# Patient Record
Sex: FEMALE | Race: BLACK OR AFRICAN AMERICAN
[De-identification: names, ages, dates, MRNs, and addresses within clinical notes are randomized per-mention and may not be internally consistent; named-entity substitution may affect disease eponyms.]

---

## 2019-08-14 ENCOUNTER — HOSPITAL ENCOUNTER (INPATIENT)
Dept: HOSPITAL 74 - YASAS | Age: 64
LOS: 14 days | Discharge: HOME | DRG: 772 | End: 2019-08-28
Attending: NEUROMUSCULOSKELETAL MEDICINE & OMM | Admitting: NEUROMUSCULOSKELETAL MEDICINE & OMM
Payer: COMMERCIAL

## 2019-08-14 VITALS — BODY MASS INDEX: 16.7 KG/M2

## 2019-08-14 DIAGNOSIS — F10.20: Primary | ICD-10-CM

## 2019-08-14 DIAGNOSIS — D64.9: ICD-10-CM

## 2019-08-14 DIAGNOSIS — F11.20: ICD-10-CM

## 2019-08-14 PROCEDURE — HZ42ZZZ GROUP COUNSELING FOR SUBSTANCE ABUSE TREATMENT, COGNITIVE-BEHAVIORAL: ICD-10-PCS | Performed by: ALLERGY & IMMUNOLOGY

## 2019-08-14 RX ADMIN — Medication SCH MG: at 22:09

## 2019-08-14 RX ADMIN — NICOTINE SCH MG: 14 PATCH, EXTENDED RELEASE TRANSDERMAL at 21:30

## 2019-08-14 RX ADMIN — ACETAMINOPHEN PRN MG: 325 TABLET ORAL at 22:08

## 2019-08-14 NOTE — HP
CIWA Score


Nausea/Vomitin-Mild Nausea/No Vomiting


Muscle Tremors: None


Anxiety: 2


Agitation: 3


Paroxysmal Sweats: 2


Orientation: 0-Oriented


Tacttile Disturbances: 2-Mild Itch/Numbness/Burn


Auditory Disturbances: 0-None


Visual Disturbances: 0-None


Headache: 2-Mild


CIWA-Ar Total Score: 12





- Admission Criteria


[facility] Guidelines: Admission for Medically Managed Detox: 


Requires at least one of the followin. CIWA greater than 12


2. Seizures within the past 24 hours


3. Delirium tremens within the past 24 hours


4. Hallucinations within the past 24 hours


5. Acute intervention needed for co  occurring medical disorder


6. Acute intervention needed for co  occurring psychiatric disorder


7. Severe withdrawal that cannot be handled at a lower level of care (continued


    vomiting, continued diarrhea, abnormal vital signs) requiring intravenous


    medication and/or fluids


8. Pregnancy








Admission ROS [facility]





- [facility]


Chief Complaint: 





alcohol detox


Allergies/Adverse Reactions: 


 Allergies











Allergy/AdvReac Type Severity Reaction Status Date / Time


 


No Known Allergies Allergy   Verified 19 16:43











History of Present Illness: 








Patient is a 63 yo F with a PMhx Anemia, coming in for detox from alcohol. She 

does not want rehab.


Last drink at 12:30pm. 1 Can


Usually drinks 2 cans of beers a day for many years.


Patient has hx of Heroin use, currently on 70mg of Methadone. She forgot the 

name of the place but says its in [place].


Denies current heroin use.





Lives in an apartment with boyfriend.


Smokes half ppd.


denies hx of seizures, blacking out.








Patient frail appearing, malnourished, and currently unsafe for d/c at this 

time.


Will admit for detox.





Here for detox last -2014


Exam Limitations: No Limitations





- Ebola screening


Have you traveled outside of the country in the last 21 days: No


Have you had contact with anyone from an Ebola affected area: No





- Review of Systems


Constitutional: No Symptoms Reported


EENT: reports: No Symptoms Reported


Respiratory: reports: No Symptoms reported


Cardiac: reports: No Symptoms Reported


GI: reports: No Symptoms Reported


: reports: No Symptoms Reported





Patient History





- Patient Medical History


Hx Anemia: Yes (no med)


Hx Asthma: No


Hx Chronic Obstructive Pulmonary Disease (COPD): No


Hx Cancer: No


Hx Cardiac Disorders: No


Hx Congestive Heart Failure: No


Hx Hypertension: No


Hx Hypercholesterolemia: No


Hx Pacemaker: No


HX Cerebrovascular Accident: No


Hx Seizures: No


Hx Dementia: No


Hx Diabetes: No


Hx Gastrointestinal Disorders: No


Hx Liver Disease: No


Hx Genitourinary Disorders: No


Hx Renal Disease (ESRD): No


Hx Thyroid Disease: No


Hx Human Immunodeficiency Virus (HIV): No ( last)


Hx Hepatitis C: No


Hx Depression: No


Hx Suicide Attempt: No


Hx Bipolar Disorder: No


Hx Schizophrenia: No





- Patient Surgical History


Past Surgical History: Yes


Hx Neurologic Surgery: No


Hx Cataract Extraction: No


Hx Cardiac Surgery: No


Hx Lung Surgery: No


Hx Breast Surgery: No


Hx Breast Biopsy: No


Hx Abdominal Surgery: Yes (bleeding peptic ulcer since age of 43 lbs)


Hx Appendectomy: No


Hx Cholecystectomy: No


Hx Genitourinary Surgery: No


Hx  Section: No


Hx Orthopedic Surgery: No


Anesthesia Reaction: No





- PPD History


Date: 14





- Smoking Cessation


Smoking history: Current every day smoker


Have you smoked in the past 12 months: Yes


Aproximately how many cigarettes per day: 10


Cigars Per Day: 0


Hx Chewing Tobacco Use: No


Initiated information on smoking cessation: Yes


'Breaking Loose' booklet given: 19





- Substances abused


  ** Alcohol


Substance route: Oral


Frequency: Daily


Amount used: BEER- 2 16OZ


Age of first use: 15


Date of last use: 19





Family Disease History





- Family Disease History


Family Disease History: Other: Brother (alcohol,), Sister (alcohol,

)





Admission Physical Exam [facility]





- Vital Signs


Vital Signs: 


 Vital Signs - 24 hr











  19





  16:42


 


Temperature 98.7 F


 


Pulse Rate 85


 


Respiratory 18





Rate 


 


Blood Pressure 112/78














- Physical


General Appearance: Yes: Thin


HEENTM: Yes: Scleral Ictenus R, Scleral Ictenus L


Respiratory: Yes: No Respiratory Distress, No Accessory Muscle Use


Neck: Yes: Supple


Cardiology: Yes: Regular Rate, S1, S2


Abdominal: Yes: Normal Bowel Sounds, Non Tender, Soft


Extremities: No: Pedal Edema





- Diagnostic


(1) Alcohol dependence


Current Visit: No   Status: Acute   





(2) Anemia


Current Visit: No   Status: Acute   





(3) Methadone maintenance therapy patient


Current Visit: No   Status: Acute   





Breathalyzer





- Breathalyzer


Breathalyzer: 0.050





Urine Drug Screen





- Test Device


Lot number: [id number]


Expiration date: 20





- Control


Is test valid?: Yes





- Results


Drug screen NEGATIVE: No


Urine drug screen results: MTD-Methadone





Inpatient Rehab Admission





- Rehab Decision to Admit


Inpatient rehab admission?: No

## 2019-08-14 NOTE — PN
Teaching Attending Note


Name of Resident: Jody Morales





ATTENDING PHYSICIAN STATEMENT





I saw and evaluated the patient.


I reviewed the resident's note and discussed the case with the resident.


I agree with the resident's findings and plan as documented.








SUBJECTIVE: 65 yo female with h/o OUD on Methadone maintenance- here for rehab 

from alcohol use. Pt states she drinks 2 beers a day. Does not get withdrawal Sx

, no h/o DT's, seizures.








OBJECTIVE:


 Vital Signs - 24 hr











  08/14/19





  16:42


 


Temperature 98.7 F


 


Pulse Rate 85


 


Respiratory 18





Rate 


 


Blood Pressure 112/78








alert and oriented


thin/cahectic








ASSESSMENT AND PLAN:


pt admitted for rehab for AUD


continue methadone

## 2019-08-15 LAB
ALBUMIN SERPL-MCNC: 3.3 G/DL (ref 3.4–5)
ALP SERPL-CCNC: 235 U/L (ref 45–117)
ALT SERPL-CCNC: 11 U/L (ref 13–61)
ANION GAP SERPL CALC-SCNC: 7 MMOL/L (ref 8–16)
AST SERPL-CCNC: 11 U/L (ref 15–37)
BILIRUB SERPL-MCNC: 1 MG/DL (ref 0.2–1)
BUN SERPL-MCNC: 5.4 MG/DL (ref 7–18)
CALCIUM SERPL-MCNC: 9.6 MG/DL (ref 8.5–10.1)
CHLORIDE SERPL-SCNC: 103 MMOL/L (ref 98–107)
CO2 SERPL-SCNC: 29 MMOL/L (ref 21–32)
CREAT SERPL-MCNC: 0.5 MG/DL (ref 0.55–1.3)
DEPRECATED RDW RBC AUTO: 16 % (ref 11.6–15.6)
GLUCOSE SERPL-MCNC: 86 MG/DL (ref 74–106)
HCT VFR BLD CALC: 35.1 % (ref 32.4–45.2)
HGB BLD-MCNC: 10.9 GM/DL (ref 10.7–15.3)
MCH RBC QN AUTO: 21.8 PG (ref 25.7–33.7)
MCHC RBC AUTO-ENTMCNC: 31 G/DL (ref 32–36)
MCV RBC: 70.2 FL (ref 80–96)
PLATELET # BLD AUTO: 413 K/MM3 (ref 134–434)
PMV BLD: 8.5 FL (ref 7.5–11.1)
POTASSIUM SERPLBLD-SCNC: 4.4 MMOL/L (ref 3.5–5.1)
PROT SERPL-MCNC: 8.3 G/DL (ref 6.4–8.2)
RBC # BLD AUTO: 4.99 M/MM3 (ref 3.6–5.2)
SODIUM SERPL-SCNC: 139 MMOL/L (ref 136–145)
WBC # BLD AUTO: 9.9 K/MM3 (ref 4–10)

## 2019-08-15 RX ADMIN — NICOTINE SCH MG: 14 PATCH, EXTENDED RELEASE TRANSDERMAL at 09:14

## 2019-08-15 RX ADMIN — Medication SCH TAB: at 09:14

## 2019-08-15 RX ADMIN — Medication SCH MG: at 21:36

## 2019-08-16 LAB
APPEARANCE UR: (no result)
BACTERIA # UR AUTO: 93 /HPF
BILIRUB UR STRIP.AUTO-MCNC: (no result) MG/DL
CASTS URNS QL MICRO: 12 /LPF (ref 0–8)
COLOR UR: (no result)
CRYSTALS URNS QL MICRO: (no result) /HPF
EPITH CASTS URNS QL MICRO: 14.3 /HPF
KETONES UR QL STRIP: (no result)
LEUKOCYTE ESTERASE UR QL STRIP.AUTO: (no result)
NITRITE UR QL STRIP: POSITIVE
PH UR: 5.5 [PH] (ref 5–8)
PROT UR QL STRIP: NEGATIVE
PROT UR QL STRIP: NEGATIVE
RBC # BLD AUTO: 1 /HPF (ref 0–4)
SP GR UR: 1.02 (ref 1.01–1.03)
UROBILINOGEN UR STRIP-MCNC: 2 MG/DL (ref 0.2–1)
WBC # UR AUTO: 17 /HPF (ref 0–5)

## 2019-08-16 RX ADMIN — METHOCARBAMOL PRN MG: 500 TABLET ORAL at 21:58

## 2019-08-16 RX ADMIN — NICOTINE SCH MG: 14 PATCH, EXTENDED RELEASE TRANSDERMAL at 09:36

## 2019-08-16 RX ADMIN — Medication SCH MG: at 21:58

## 2019-08-16 RX ADMIN — METHADONE HYDROCHLORIDE SCH MG: 40 TABLET ORAL at 06:26

## 2019-08-16 RX ADMIN — Medication SCH TAB: at 09:37

## 2019-08-17 RX ADMIN — Medication SCH MG: at 21:38

## 2019-08-17 RX ADMIN — NICOTINE SCH MG: 14 PATCH, EXTENDED RELEASE TRANSDERMAL at 09:32

## 2019-08-17 RX ADMIN — Medication SCH TAB: at 09:32

## 2019-08-17 RX ADMIN — ACETAMINOPHEN PRN MG: 325 TABLET ORAL at 17:38

## 2019-08-17 RX ADMIN — METHADONE HYDROCHLORIDE SCH MG: 40 TABLET ORAL at 06:27

## 2019-08-17 RX ADMIN — HYDROXYZINE PAMOATE PRN MG: 25 CAPSULE ORAL at 21:38

## 2019-08-18 RX ADMIN — NICOTINE SCH MG: 14 PATCH, EXTENDED RELEASE TRANSDERMAL at 09:41

## 2019-08-18 RX ADMIN — Medication SCH MG: at 21:33

## 2019-08-18 RX ADMIN — METHOCARBAMOL PRN MG: 500 TABLET ORAL at 06:46

## 2019-08-18 RX ADMIN — METHADONE HYDROCHLORIDE SCH MG: 40 TABLET ORAL at 06:37

## 2019-08-18 RX ADMIN — Medication PRN MG: at 21:33

## 2019-08-18 RX ADMIN — Medication SCH TAB: at 09:41

## 2019-08-19 LAB
APPEARANCE UR: (no result)
BACTERIA # UR AUTO: 152.4 /HPF
BILIRUB UR STRIP.AUTO-MCNC: (no result) MG/DL
CASTS URNS QL MICRO: 48 /LPF (ref 0–8)
COLOR UR: (no result)
CRYSTALS URNS QL MICRO: (no result) /HPF
EPITH CASTS URNS QL MICRO: 5.3 /HPF
KETONES UR QL STRIP: NEGATIVE
LEUKOCYTE ESTERASE UR QL STRIP.AUTO: (no result)
NITRITE UR QL STRIP: NEGATIVE
PH UR: 5 [PH] (ref 5–8)
PROT UR QL STRIP: NEGATIVE
PROT UR QL STRIP: NEGATIVE
RBC # BLD AUTO: 5.4 /HPF (ref 0–4)
SP GR UR: 1.02 (ref 1.01–1.03)
UROBILINOGEN UR STRIP-MCNC: 1 MG/DL (ref 0.2–1)
WBC # UR AUTO: 48 /HPF (ref 0–5)

## 2019-08-19 RX ADMIN — SULFAMETHOXAZOLE AND TRIMETHOPRIM SCH EACH: 800; 160 TABLET ORAL at 21:33

## 2019-08-19 RX ADMIN — Medication SCH MG: at 21:32

## 2019-08-19 RX ADMIN — NICOTINE SCH MG: 14 PATCH, EXTENDED RELEASE TRANSDERMAL at 09:06

## 2019-08-19 RX ADMIN — Medication SCH TAB: at 09:04

## 2019-08-19 RX ADMIN — METHOCARBAMOL PRN MG: 500 TABLET ORAL at 09:04

## 2019-08-19 RX ADMIN — METHADONE HYDROCHLORIDE SCH MG: 40 TABLET ORAL at 06:28

## 2019-08-19 RX ADMIN — HYDROXYZINE PAMOATE PRN MG: 25 CAPSULE ORAL at 21:32

## 2019-08-19 NOTE — PN
[facility] Progress Note


Note: 





lab review:








 Laboratory Tests











  08/15/19 08/15/19 08/15/19





  08:13 08:13 08:13


 


WBC  9.9  


 


RBC  4.99  


 


Hgb  10.9  


 


Hct  35.1  


 


MCV  70.2 L  


 


MCH  21.8 L  


 


MCHC  31.0 L  


 


RDW  16.0 H  


 


Plt Count  413  D  


 


MPV  8.5  


 


Sodium   139 


 


Potassium   4.4 


 


Chloride   103 


 


Carbon Dioxide   29 


 


Anion Gap   7 L 


 


BUN   5.4 L 


 


Creatinine   0.5 L 


 


Est GFR (CKD-EPI)AfAm   118.54 


 


Est GFR (CKD-EPI)NonAf   102.28 


 


Random Glucose   86 


 


Calcium   9.6 


 


Total Bilirubin   1.0 


 


AST   11 L 


 


ALT   11 L 


 


Alkaline Phosphatase   235 H 


 


Total Protein   8.3 H 


 


Albumin   3.3 L 


 


Urine Color   


 


Urine Appearance   


 


Urine pH   


 


Ur Specific Gravity   


 


Urine Protein   


 


Urine Glucose (UA)   


 


Urine Ketones   


 


Urine Blood   


 


Urine Nitrite   


 


Urine Bilirubin   


 


Urine Urobilinogen   


 


Ur Leukocyte Esterase   


 


Urine WBC (Auto)   


 


Urine RBC (Auto)   


 


Urine Casts (Auto)   


 


U Epithel Cells (Auto)   


 


Urine Crystals (Auto)   


 


Urine Bacteria (Auto)   


 


RPR Titer    Nonreactive














  08/16/19





  15:18


 


WBC 


 


RBC 


 


Hgb 


 


Hct 


 


MCV 


 


MCH 


 


MCHC 


 


RDW 


 


Plt Count 


 


MPV 


 


Sodium 


 


Potassium 


 


Chloride 


 


Carbon Dioxide 


 


Anion Gap 


 


BUN 


 


Creatinine 


 


Est GFR (CKD-EPI)AfAm 


 


Est GFR (CKD-EPI)NonAf 


 


Random Glucose 


 


Calcium 


 


Total Bilirubin 


 


AST 


 


ALT 


 


Alkaline Phosphatase 


 


Total Protein 


 


Albumin 


 


Urine Color  Dk yellow


 


Urine Appearance  Turbid


 


Urine pH  5.5


 


Ur Specific Gravity  1.024


 


Urine Protein  Negative


 


Urine Glucose (UA)  Negative


 


Urine Ketones  Trace H


 


Urine Blood  Negative


 


Urine Nitrite  Positive H


 


Urine Bilirubin  1+ H


 


Urine Urobilinogen  2.0 H


 


Ur Leukocyte Esterase  2+ H


 


Urine WBC (Auto)  17


 


Urine RBC (Auto)  1


 


Urine Casts (Auto)  12


 


U Epithel Cells (Auto)  14.3


 


Urine Crystals (Auto)  Calcium oxalate


 


Urine Bacteria (Auto)  93.0


 


RPR Titer 








Abnormal UA, cbc,cmp


Pt denies any physical symptoms but wants tx.





plan:bactrim ds 1 tab po bid x 3 days after urine specimen collection.

## 2019-08-20 RX ADMIN — SULFAMETHOXAZOLE AND TRIMETHOPRIM SCH EACH: 800; 160 TABLET ORAL at 21:05

## 2019-08-20 RX ADMIN — SULFAMETHOXAZOLE AND TRIMETHOPRIM SCH EACH: 800; 160 TABLET ORAL at 09:42

## 2019-08-20 RX ADMIN — Medication SCH MG: at 21:05

## 2019-08-20 RX ADMIN — NICOTINE SCH MG: 14 PATCH, EXTENDED RELEASE TRANSDERMAL at 09:42

## 2019-08-20 RX ADMIN — METHOCARBAMOL PRN MG: 500 TABLET ORAL at 11:35

## 2019-08-20 RX ADMIN — Medication SCH TAB: at 09:42

## 2019-08-20 RX ADMIN — METHADONE HYDROCHLORIDE SCH MG: 40 TABLET ORAL at 06:26

## 2019-08-20 RX ADMIN — ACETAMINOPHEN PRN MG: 325 TABLET ORAL at 09:42

## 2019-08-21 RX ADMIN — ACETAMINOPHEN PRN MG: 325 TABLET ORAL at 09:06

## 2019-08-21 RX ADMIN — METHADONE HYDROCHLORIDE SCH MG: 40 TABLET ORAL at 06:27

## 2019-08-21 RX ADMIN — NICOTINE SCH MG: 14 PATCH, EXTENDED RELEASE TRANSDERMAL at 09:06

## 2019-08-21 RX ADMIN — Medication SCH MG: at 21:29

## 2019-08-21 RX ADMIN — SULFAMETHOXAZOLE AND TRIMETHOPRIM SCH EACH: 800; 160 TABLET ORAL at 09:06

## 2019-08-21 RX ADMIN — Medication PRN MG: at 21:29

## 2019-08-21 RX ADMIN — ACETAMINOPHEN PRN MG: 325 TABLET ORAL at 17:31

## 2019-08-21 RX ADMIN — SULFAMETHOXAZOLE AND TRIMETHOPRIM SCH EACH: 800; 160 TABLET ORAL at 21:29

## 2019-08-21 RX ADMIN — Medication SCH TAB: at 09:06

## 2019-08-22 RX ADMIN — NICOTINE SCH MG: 14 PATCH, EXTENDED RELEASE TRANSDERMAL at 09:50

## 2019-08-22 RX ADMIN — SULFAMETHOXAZOLE AND TRIMETHOPRIM SCH EACH: 800; 160 TABLET ORAL at 09:50

## 2019-08-22 RX ADMIN — Medication SCH MG: at 21:37

## 2019-08-22 RX ADMIN — Medication PRN MG: at 21:38

## 2019-08-22 RX ADMIN — SULFAMETHOXAZOLE AND TRIMETHOPRIM SCH EACH: 800; 160 TABLET ORAL at 21:38

## 2019-08-22 RX ADMIN — Medication SCH TAB: at 09:50

## 2019-08-22 RX ADMIN — METHADONE HYDROCHLORIDE SCH MG: 40 TABLET ORAL at 06:25

## 2019-08-22 RX ADMIN — ACETAMINOPHEN PRN MG: 325 TABLET ORAL at 09:52

## 2019-08-23 RX ADMIN — METHADONE HYDROCHLORIDE SCH MG: 40 TABLET ORAL at 06:22

## 2019-08-23 RX ADMIN — Medication SCH TAB: at 09:23

## 2019-08-23 RX ADMIN — ACETAMINOPHEN PRN MG: 325 TABLET ORAL at 09:24

## 2019-08-23 RX ADMIN — ACETAMINOPHEN PRN MG: 325 TABLET ORAL at 17:47

## 2019-08-23 RX ADMIN — Medication PRN MG: at 21:52

## 2019-08-23 RX ADMIN — Medication SCH MG: at 21:52

## 2019-08-23 RX ADMIN — NICOTINE SCH MG: 14 PATCH, EXTENDED RELEASE TRANSDERMAL at 09:23

## 2019-08-24 RX ADMIN — Medication PRN MG: at 21:40

## 2019-08-24 RX ADMIN — ACETAMINOPHEN PRN MG: 325 TABLET ORAL at 08:44

## 2019-08-24 RX ADMIN — METHADONE HYDROCHLORIDE SCH MG: 40 TABLET ORAL at 06:32

## 2019-08-24 RX ADMIN — Medication SCH MG: at 21:39

## 2019-08-24 RX ADMIN — NICOTINE SCH MG: 14 PATCH, EXTENDED RELEASE TRANSDERMAL at 09:15

## 2019-08-24 RX ADMIN — Medication SCH TAB: at 09:15

## 2019-08-24 RX ADMIN — ACETAMINOPHEN PRN MG: 325 TABLET ORAL at 16:49

## 2019-08-25 RX ADMIN — Medication SCH TAB: at 10:22

## 2019-08-25 RX ADMIN — Medication SCH MG: at 21:17

## 2019-08-25 RX ADMIN — METHADONE HYDROCHLORIDE SCH MG: 40 TABLET ORAL at 06:41

## 2019-08-25 RX ADMIN — Medication PRN MG: at 21:17

## 2019-08-25 RX ADMIN — NICOTINE SCH MG: 14 PATCH, EXTENDED RELEASE TRANSDERMAL at 10:22

## 2019-08-26 RX ADMIN — ACETAMINOPHEN PRN MG: 325 TABLET ORAL at 09:53

## 2019-08-26 RX ADMIN — Medication PRN MG: at 21:41

## 2019-08-26 RX ADMIN — Medication SCH TAB: at 09:53

## 2019-08-26 RX ADMIN — METHADONE HYDROCHLORIDE SCH MG: 40 TABLET ORAL at 06:38

## 2019-08-26 RX ADMIN — Medication SCH MG: at 21:40

## 2019-08-26 RX ADMIN — NICOTINE SCH MG: 14 PATCH, EXTENDED RELEASE TRANSDERMAL at 09:53

## 2019-08-27 RX ADMIN — Medication SCH TAB: at 09:02

## 2019-08-27 RX ADMIN — ACETAMINOPHEN PRN MG: 325 TABLET ORAL at 09:02

## 2019-08-27 RX ADMIN — NICOTINE SCH MG: 14 PATCH, EXTENDED RELEASE TRANSDERMAL at 09:02

## 2019-08-27 RX ADMIN — Medication PRN MG: at 21:28

## 2019-08-27 RX ADMIN — ACETAMINOPHEN PRN MG: 325 TABLET ORAL at 16:55

## 2019-08-27 RX ADMIN — Medication SCH MG: at 21:28

## 2019-08-27 RX ADMIN — METHADONE HYDROCHLORIDE SCH MG: 40 TABLET ORAL at 06:48

## 2019-08-28 VITALS — HEART RATE: 70 BPM | DIASTOLIC BLOOD PRESSURE: 64 MMHG | SYSTOLIC BLOOD PRESSURE: 97 MMHG

## 2019-08-28 VITALS — TEMPERATURE: 98.5 F

## 2019-08-28 RX ADMIN — Medication SCH TAB: at 09:04

## 2019-08-28 RX ADMIN — ACETAMINOPHEN PRN MG: 325 TABLET ORAL at 08:33

## 2019-08-28 RX ADMIN — NICOTINE SCH MG: 14 PATCH, EXTENDED RELEASE TRANSDERMAL at 09:05

## 2019-08-28 NOTE — DS
[facility] Rehab Discharge Summary





- [facility] Rehab Discharge Summary


Admission Date: 08/14/19


Discharge Date: 08/28/19





- History


Present History: Alcohol dependence, MMTP


Pertinent Past History: 


Patient is a 65 yo F with a PMhx Anemia, .


Usually drinks 2 cans of beers a day for many years.


Patient has hx of Heroin use, currently on 70mg of Methadone. 


Denies current heroin use.





Lives in an apartment with boyfriend.


Smokes half ppd.


denies hx of seizures, blacking out.








- Discharge Physical Exam


Vital Signs: 


 Vital Signs











Temperature  98.5 F   08/28/19 07:15


 


Pulse Rate  70   08/28/19 07:15


 


Respiratory Rate  18   08/28/19 07:15


 


Blood Pressure  97/64   08/28/19 07:15


 


O2 Sat by Pulse Oximetry (%)      











Pertinent Admission Physical Exam Findings: 


Physical


General Appearance:No apparent distress


HEENTM: Normocephalic


Respiratory: clear


Neck: Yes: Supple


Cardiology:  Regular Rate, S1, S2


Abdominal: +Bowel Sounds, Non Tender, Soft


MSK: full ROM, full weight bearing, steady gait


Neurological: CN 2-12 intact; no neurological deficits noted. 








- Treatment


Discharge Condition: Discharge condition good


Hospital Course: 


Patient attended groups, was adherent to treatment plan and medication regimen. 

REFUSED Aftercare referral. 








- Medication


Discharge Medications: 


Ambulatory Orders





Methadone [Dolophine -] 70 mg PO DAILY 08/14/19 











- Medication-Assisted Treatment (MAT)


Medication-Assisted Treatment (MAT): No


MAT Follow-up Referral: 


patient will return to her MMTP.  Prescription for Methadone not given; patient 

had her last dose before discharge. 








- Discharge Instructions


Diet, activity, other medical instructions: 





Diet: as tolerated





Activity: as tolerated





Other medical instructions: Patient refused aftercare referral; encouraged to 

attend 12 step program and get a sponsor. 





PLEASE NOTE: Methadone was NOT prescribed; automatically filled by form.  

Patient received dose of methadone before discharge and will continue MMTP at 

her program. 





- AMA


Did Patient Leave Against Medical Advice: No